# Patient Record
Sex: FEMALE | URBAN - METROPOLITAN AREA
[De-identification: names, ages, dates, MRNs, and addresses within clinical notes are randomized per-mention and may not be internally consistent; named-entity substitution may affect disease eponyms.]

---

## 2019-10-10 ENCOUNTER — TELEPHONE (OUTPATIENT)
Dept: NEUROLOGY | Facility: CLINIC | Age: 24
End: 2019-10-10

## 2019-10-10 NOTE — TELEPHONE ENCOUNTER
Received records request from [address] [place] [address] requesting records from 1/1/2016 to present on the patient    Office of [facility]  15th Floor  [address] [facility], [address] [place] [address]   Faxed to [facility] \[facility] [place] [facility]\"" on 10/10/2019